# Patient Record
Sex: FEMALE | Race: WHITE | NOT HISPANIC OR LATINO | URBAN - NONMETROPOLITAN AREA
[De-identification: names, ages, dates, MRNs, and addresses within clinical notes are randomized per-mention and may not be internally consistent; named-entity substitution may affect disease eponyms.]

---

## 2021-10-06 PROBLEM — E66.9 OBESITY (BMI 30.0-34.9): Chronic | Status: CHRONIC | Noted: 2021-10-06

## 2021-10-06 PROBLEM — G47.00 INSOMNIA: Chronic | Status: CHRONIC | Noted: 2021-10-06

## 2021-10-06 PROBLEM — Z92.89 HISTORY OF RENAL DIALYSIS: Chronic | Status: CHRONIC | Noted: 2021-10-06

## 2021-10-06 PROBLEM — D64.9 NORMOCYTIC ANEMIA: Chronic | Status: CHRONIC | Noted: 2021-10-06

## 2021-10-06 PROBLEM — Z87.898 HISTORY OF ABDOMINAL ABSCESS: Chronic | Status: CHRONIC | Noted: 2021-10-06

## 2021-10-06 PROBLEM — F41.0 PANIC ATTACK: Chronic | Status: CHRONIC | Noted: 2021-10-06

## 2021-10-06 PROBLEM — N17.0 ACUTE KIDNEY INJURY (AKI) WITH ACUTE TUBULAR NECROSIS (ATN) (CMS/HCC): Chronic | Status: CHRONIC | Noted: 2021-10-06

## 2021-10-06 PROBLEM — R60.9 PERIPHERAL EDEMA: Chronic | Status: CHRONIC | Noted: 2021-10-06

## 2021-10-06 PROBLEM — F41.8 MIXED ANXIETY DEPRESSIVE DISORDER: Chronic | Status: CHRONIC | Noted: 2021-10-06

## 2021-10-06 PROBLEM — I26.99 PULMONARY EMBOLUS (CMS/HCC): Chronic | Status: CHRONIC | Noted: 2021-10-06

## 2021-10-06 PROBLEM — R53.83 FATIGUE: Chronic | Status: CHRONIC | Noted: 2021-10-06

## 2021-10-06 PROBLEM — K21.9 GASTROESOPHAGEAL REFLUX DISEASE WITH HIATAL HERNIA: Chronic | Status: CHRONIC | Noted: 2021-10-06

## 2021-10-06 PROBLEM — R03.0 WHITE COAT SYNDROME WITHOUT DIAGNOSIS OF HYPERTENSION: Chronic | Status: CHRONIC | Noted: 2021-10-06

## 2021-10-06 PROBLEM — N18.1 CKD (CHRONIC KIDNEY DISEASE) STAGE 1, GFR 90 ML/MIN OR GREATER: Chronic | Status: CHRONIC | Noted: 2021-10-06

## 2021-10-06 PROBLEM — N17.9: Chronic | Status: CHRONIC | Noted: 2021-10-06

## 2021-10-19 PROBLEM — C18.6 MALIGNANT NEOPLASM OF DESCENDING COLON (CMS/HCC): Chronic | Status: CHRONIC | Noted: 2021-10-19

## 2021-10-19 PROBLEM — Z93.2 ILEOSTOMY STATUS (CMS/HCC): Chronic | Status: CHRONIC | Noted: 2021-10-19

## 2021-10-19 PROBLEM — S31.109A OPEN ABDOMINAL WALL WOUND: Chronic | Status: CHRONIC | Noted: 2021-10-19

## 2023-05-03 PROBLEM — E78.00 ELEVATED CHOLESTEROL: Chronic | Status: CHRONIC | Noted: 2023-05-03

## 2024-03-26 ENCOUNTER — OFFICE (OUTPATIENT)
Dept: URBAN - NONMETROPOLITAN AREA CLINIC 1 | Facility: CLINIC | Age: 49
End: 2024-03-26

## 2024-03-26 VITALS
HEIGHT: 66 IN | WEIGHT: 246 LBS | HEART RATE: 97 BPM | DIASTOLIC BLOOD PRESSURE: 100 MMHG | SYSTOLIC BLOOD PRESSURE: 150 MMHG | DIASTOLIC BLOOD PRESSURE: 98 MMHG

## 2024-03-26 DIAGNOSIS — Z93.2 ILEOSTOMY STATUS: ICD-10-CM

## 2024-03-26 DIAGNOSIS — E86.0 DEHYDRATION: ICD-10-CM

## 2024-03-26 PROCEDURE — 99204 OFFICE O/P NEW MOD 45 MIN: CPT | Performed by: NURSE PRACTITIONER

## 2024-03-26 NOTE — SERVICEHPINOTES
She says that she was referred to us from [facility] because of dehydration.  She says she has had electrolyte imbalance and has required IV hydration every week for the past several weeks. It was felt that her imbalance was due to high ileostomy output and she is referred to us for further evaluation.   She does not have nausea or vomiting or black stooling.  No abdominal pain or unexpected weight loss.br
br History of colon cancer, right hemicolectomy with end ileostomy.br Flex sig 11/29/22 by Dr. Conti-
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 I advanced to approximately 35 centimeters which appears to be the terminal end of the remaining colon. One small rectosigmoid diverticulum. Otherwise remainder of the colon appeared normal.
brRecommendations: Follow-up in clinic with Dr. Conti in 1 year. Consider repeat flexible sigmoidoscopy in 3 years

## 2024-03-26 NOTE — SERVICENOTES
I will request records from [facility]  with current labs and notes regarding the dehydration.
Once I have records and CT results, I will have a better idea of what is going on.  In the meanwhile, she is scheduled for EGD and flexible sigmoidoscopy.

## 2024-05-31 ENCOUNTER — OFFICE (OUTPATIENT)
Dept: URBAN - NONMETROPOLITAN AREA CLINIC 1 | Facility: CLINIC | Age: 49
End: 2024-05-31
Payer: COMMERCIAL

## 2024-05-31 VITALS
WEIGHT: 246 LBS | HEART RATE: 74 BPM | HEIGHT: 66 IN | SYSTOLIC BLOOD PRESSURE: 134 MMHG | DIASTOLIC BLOOD PRESSURE: 100 MMHG

## 2024-05-31 DIAGNOSIS — R53.83 OTHER FATIGUE: ICD-10-CM

## 2024-05-31 DIAGNOSIS — R41.89 OTHER SYMPTOMS AND SIGNS INVOLVING COGNITIVE FUNCTIONS AND A: ICD-10-CM

## 2024-05-31 DIAGNOSIS — Z93.2 ILEOSTOMY STATUS: ICD-10-CM

## 2024-05-31 DIAGNOSIS — E66.9 OBESITY, UNSPECIFIED: ICD-10-CM

## 2024-05-31 PROCEDURE — 36415 COLL VENOUS BLD VENIPUNCTURE: CPT | Performed by: INTERNAL MEDICINE

## 2024-05-31 PROCEDURE — 99214 OFFICE O/P EST MOD 30 MIN: CPT | Performed by: INTERNAL MEDICINE

## 2024-05-31 RX ORDER — COLESTIPOL HYDROCHLORIDE 1 G/1
TABLET, FILM COATED ORAL
Qty: 60 | Refills: 11 | Status: ACTIVE
Start: 2024-05-31